# Patient Record
(demographics unavailable — no encounter records)

---

## 2024-11-01 NOTE — HISTORY OF PRESENT ILLNESS
[de-identified] : F/U for HTN.  He has been taking valsartan HCTZ daily. No lightheadedness.  Using cialis with good results. without s/e.

## 2025-01-03 NOTE — HISTORY OF PRESENT ILLNESS
[de-identified] : He has been taking HCTZ-valsartan daily. No lightheadedness. BP at home is typically in the 110/80 range. Using tadalafil 10mg. No lightheadedness or other s/e. 10 mg is not providing enough response.  Would like STD testing. Partners are female. Currently no rash, ulcer, dysuria or other symptoms of STD.

## 2025-01-24 NOTE — HISTORY OF PRESENT ILLNESS
[FreeTextEntry8] : CC: sinusitis  First became sick 3 days ago. Sinus pain and cough. No fever. no sore throat. Poor appetite, No N/V/D. nasal discharge is green. He has been using sinus rinses without relief.  No allergies. No recent ABx use.

## 2025-02-28 NOTE — PHYSICAL EXAM
[Normal] : normal gait, coordination grossly intact, no focal deficits and deep tendon reflexes were 2+ and symmetric [de-identified] : No dysmetria, dysarthria or facial droop.

## 2025-02-28 NOTE — HISTORY OF PRESENT ILLNESS
[FreeTextEntry8] : CC: needs labs for work.   Feels well.  Would like STD testing as well. No specific suspected exposure or symptoms of STD.

## 2025-05-16 NOTE — PHYSICAL EXAM
[Normal] : normal rate, regular rhythm, normal S1 and S2 and no murmur heard [No Edema] : there was no peripheral edema [No Joint Swelling] : no joint swelling [Grossly Normal Strength/Tone] : grossly normal strength/tone [Coordination Grossly Intact] : coordination grossly intact [No Focal Deficits] : no focal deficits [Normal Gait] : normal gait [de-identified] : Full NT ROM BL UE

## 2025-05-16 NOTE — HISTORY OF PRESENT ILLNESS
[de-identified] : Presenting for follow up. He has been taking his Valsartan daily. Home BP is typically 140s/ ??. he did not bring his log.  Following a low salt diet. Exercising regularly.  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  Would like STD testing. No symptoms or known exposure. Partners are female.  Feels well.

## 2025-05-19 NOTE — PHYSICAL EXAM
[No Acute Distress] : no acute distress [Normal Sclera/Conjunctiva] : normal sclera/conjunctiva [Normal Outer Ear/Nose] : the outer ears and nose were normal in appearance [Soft] : abdomen soft [Non Tender] : non-tender [Non-distended] : non-distended [Normal Bowel Sounds] : normal bowel sounds [No Joint Swelling] : no joint swelling [No Rash] : no rash [Normal Affect] : the affect was normal [Normal Insight/Judgement] : insight and judgment were intact

## 2025-05-19 NOTE — HISTORY OF PRESENT ILLNESS
[Mild] : mild [___ Days ago] : [unfilled] days ago [Episodic] : episodic [Diarrhea] : diarrhea [FreeTextEntry8] : Patient ate shrimp last night and began having diarrhea multiple episodes today that have been improving, denies any fever or N/V, reports 's at home

## 2025-07-25 NOTE — PHYSICAL EXAM
[Normal] : normal rate, regular rhythm, normal S1 and S2 and no murmur heard [No Edema] : there was no peripheral edema [Soft] : abdomen soft [Non Tender] : non-tender [Non-distended] : non-distended [Normal Bowel Sounds] : normal bowel sounds [de-identified] : Unable to visualize the oropharynx

## 2025-07-25 NOTE — HISTORY OF PRESENT ILLNESS
[de-identified] : Presenting for follow up. He has been feeling sick. Sore throat and cough, Started 2 days ago. Tactile fever. + sinus congestion. No ear discomfort. No sick contacts. No recent travel He is taking Valsartan/HCTZ and amlodipine